# Patient Record
Sex: FEMALE | Race: BLACK OR AFRICAN AMERICAN
[De-identification: names, ages, dates, MRNs, and addresses within clinical notes are randomized per-mention and may not be internally consistent; named-entity substitution may affect disease eponyms.]

---

## 2019-04-01 ENCOUNTER — HOSPITAL ENCOUNTER (OUTPATIENT)
Dept: HOSPITAL 53 - M RAD | Age: 19
End: 2019-04-01
Payer: COMMERCIAL

## 2019-04-01 DIAGNOSIS — M79.672: Primary | ICD-10-CM

## 2019-04-01 PROCEDURE — 78315 BONE IMAGING 3 PHASE: CPT

## 2019-04-01 NOTE — REP
TRIPLE PHASE BONE SCAN OF THE ANKLES AND FEET:

 

Following the intravenous administration of 21.7 millicuries of Technetium 99M

MDP, patient's ankles and feet are imaged in the flow phase and the anterior and

posterior projections. There is fairly symmetrical blood flow bilaterally.

 

Immediate blood pool in 3 hour delayed images are performed in the ankles and

feet in multiple projections. Slightly increased blood pooling is seen medially

in the left hindfoot. There is focal increased uptake at this location on delayed

images. This appears to be located in the anteromedial aspect of the talus or

calcaneus near the anterior aspect of the talocalcaneal joint. Otherwise there is

symmetrical radiotracer uptake bilaterally. No other scintigraphic abnormalities

are seen.

 

IMPRESSION:

 

Focal increase blood pooling and delayed activity in the left medial hindfoot,

specifically this appears to be located in the anteromedial aspect of the talus

or calcaneus. This could represent a stress fracture or stress related change.

 

 

Electronically Signed by

Dorian Daniels MD 04/02/2019 03:23 P

## 2019-06-11 ENCOUNTER — HOSPITAL ENCOUNTER (EMERGENCY)
Dept: HOSPITAL 53 - M ED | Age: 19
Discharge: HOME | End: 2019-06-11
Payer: COMMERCIAL

## 2019-06-11 VITALS — BODY MASS INDEX: 22.5 KG/M2 | WEIGHT: 122.25 LBS | HEIGHT: 62 IN

## 2019-06-11 VITALS — DIASTOLIC BLOOD PRESSURE: 63 MMHG | SYSTOLIC BLOOD PRESSURE: 102 MMHG

## 2019-06-11 DIAGNOSIS — S06.0X0A: Primary | ICD-10-CM
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
## 2019-06-11 DIAGNOSIS — Z79.899: ICD-10-CM
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
## 2019-06-11 NOTE — REPVR
EXAM: 

 CT Head Without Contrast 



EXAM DATE/TIME: 

 6/11/2019 8:01 PM 



CLINICAL HISTORY: 

 18 years old, female; Injury or trauma; Additional info: Box fell on head 



TECHNIQUE: 

 Imaging protocol: Axial computed tomography images of the head without 

contrast. 

 Radiation optimization: All CT scans at this facility use at least one of 

these dose optimization techniques: automated exposure control; mA and/or kV 

adjustment per patient size (includes targeted exams where dose is matched to 

clinical indication); or iterative reconstruction. 



COMPARISON: 

 No relevant prior studies available. 



FINDINGS: 

 Brain: Normal. No hemorrhage. Unremarkable white matter. No mass effect. 

 Ventricles: Normal. No ventriculomegaly. 

 Bones/joints: Unremarkable. No acute fracture. 

 Sinuses: Visualized sinuses are unremarkable. No fluid levels. 

 Mastoid air cells: Visualized mastoid air cells are well aerated. No mastoid 

effusion. 

 Soft tissues: Unremarkable. 



IMPRESSION: 

No acute intracranial abnormality. 



Electronically signed by: Joshua Arriaga On 06/11/2019  20:37:47 PM

## 2019-06-11 NOTE — REPVR
EXAM: 

 CT Cervical Spine Without Contrast 



EXAM DATE/TIME: 

 6/11/2019 8:01 PM 



CLINICAL HISTORY: 

 18 years old, female; Injury or trauma; Initial encounter; Blunt trauma; 

Additional info: Box fell on head 



TECHNIQUE: 

 Imaging protocol: Axial computed tomography images of the cervical spine 

without contrast. Coronal and sagittal reformatted images were created and 

reviewed. 

 Radiation optimization: All CT scans at this facility use at least one of 

these dose optimization techniques: automated exposure control; mA and/or kV 

adjustment per patient size (includes targeted exams where dose is matched to 

clinical indication); or iterative reconstruction. 



COMPARISON: 

 No relevant prior studies available. 



FINDINGS: 

 Vertebrae: No acute fracture. Normal alignment. 

 Discs/Spinal canal/Neural foramina: No spinal stenosis. No neural foraminal 

narrowing. 



 Soft tissues: Unremarkable. 

 Thyroid: Small less than 5 mm nodule left lobe of the thyroid gland 

 Lungs: Lung apices are normal. 



IMPRESSION: 

No acute findings.



COMMENT: 

 Consistent with the [employer]'s Incidental Findings 

Committee Report (J Am Derrick Radiol 2015): Unless the patient has clinical risk 

factors for thyroid cancer or has suspicious findings characterized in this 

report, for patients under 35 years old any thyroid nodule less than 1.0 cm, 

and for patients at least 35 years old any thyroid nodule less than 1.5 cm is 

highly likely to be benign and does not require follow-up imaging or biopsy. 

Patients with limited life expectancy and/or comorbidities do not require 

follow up imaging or biopsy for nodules of any size. 



Electronically signed by: Joshua Arriaga On 06/11/2019  20:36:10 PM

## 2019-09-15 ENCOUNTER — HOSPITAL ENCOUNTER (EMERGENCY)
Dept: HOSPITAL 53 - M ED | Age: 19
Discharge: HOME | End: 2019-09-15
Payer: COMMERCIAL

## 2019-09-15 VITALS — HEIGHT: 62 IN | WEIGHT: 119.71 LBS | BODY MASS INDEX: 22.03 KG/M2

## 2019-09-15 VITALS — DIASTOLIC BLOOD PRESSURE: 65 MMHG | SYSTOLIC BLOOD PRESSURE: 113 MMHG

## 2019-09-15 DIAGNOSIS — Z72.51: ICD-10-CM

## 2019-09-15 DIAGNOSIS — Z79.899: ICD-10-CM

## 2019-09-15 DIAGNOSIS — Z77.21: Primary | ICD-10-CM

## 2019-09-15 DIAGNOSIS — N90.89: ICD-10-CM

## 2019-09-15 LAB
APPEARANCE UR: CLEAR
BACTERIA UR QL AUTO: NEGATIVE
BILIRUB UR QL STRIP.AUTO: NEGATIVE
CHLAMYDIA DNA AMPLIFICATION: NEGATIVE
GLUCOSE UR QL STRIP.AUTO: NEGATIVE MG/DL
HGB UR QL STRIP.AUTO: NEGATIVE
KETONES UR QL STRIP.AUTO: NEGATIVE MG/DL
LEUKOCYTE ESTERASE UR QL STRIP.AUTO: (no result)
MUCOUS THREADS URNS QL MICRO: (no result)
N GONORRHOEA RRNA SPEC QL NAA+PROBE: NEGATIVE
NITRITE UR QL STRIP.AUTO: NEGATIVE
PH UR STRIP.AUTO: 6 UNITS (ref 5–9)
PROT UR QL STRIP.AUTO: NEGATIVE MG/DL
RBC # UR AUTO: 4 /HPF (ref 0–3)
SP GR UR STRIP.AUTO: 1.02 (ref 1–1.03)
SQUAMOUS #/AREA URNS AUTO: 4 /HPF (ref 0–6)
UROBILINOGEN UR QL STRIP.AUTO: 2 MG/DL (ref 0–2)
WBC #/AREA URNS AUTO: 2 /HPF (ref 0–3)

## 2019-09-26 ENCOUNTER — HOSPITAL ENCOUNTER (EMERGENCY)
Dept: HOSPITAL 53 - M ED | Age: 19
Discharge: HOME | End: 2019-09-26
Payer: COMMERCIAL

## 2019-09-26 VITALS — SYSTOLIC BLOOD PRESSURE: 111 MMHG | DIASTOLIC BLOOD PRESSURE: 59 MMHG

## 2019-09-26 VITALS — BODY MASS INDEX: 22.13 KG/M2 | WEIGHT: 120.26 LBS | HEIGHT: 62 IN

## 2019-09-26 DIAGNOSIS — R30.0: Primary | ICD-10-CM

## 2022-06-07 ENCOUNTER — HOSPITAL ENCOUNTER (INPATIENT)
Dept: HOSPITAL 53 - M ED | Age: 22
LOS: 3 days | Discharge: HOME | DRG: 885 | End: 2022-06-10
Attending: STUDENT IN AN ORGANIZED HEALTH CARE EDUCATION/TRAINING PROGRAM | Admitting: STUDENT IN AN ORGANIZED HEALTH CARE EDUCATION/TRAINING PROGRAM
Payer: COMMERCIAL

## 2022-06-07 VITALS — BODY MASS INDEX: 21.14 KG/M2 | WEIGHT: 114.86 LBS | HEIGHT: 62 IN

## 2022-06-07 DIAGNOSIS — F17.200: ICD-10-CM

## 2022-06-07 DIAGNOSIS — F43.21: ICD-10-CM

## 2022-06-07 DIAGNOSIS — R45.851: ICD-10-CM

## 2022-06-07 DIAGNOSIS — F43.10: ICD-10-CM

## 2022-06-07 DIAGNOSIS — F12.90: ICD-10-CM

## 2022-06-07 DIAGNOSIS — F32.1: Primary | ICD-10-CM

## 2022-06-07 DIAGNOSIS — Z91.410: ICD-10-CM

## 2022-06-07 LAB
ALBUMIN SERPL BCG-MCNC: 4 GM/DL (ref 3.2–5.2)
ALT SERPL W P-5'-P-CCNC: 16 U/L (ref 12–78)
AMPHETAMINES UR QL SCN: NEGATIVE
APAP SERPL-MCNC: < 2 UG/ML (ref 10–30)
B-HCG SERPL QL: NEGATIVE
BARBITURATES UR QL SCN: NEGATIVE
BENZODIAZ UR QL SCN: NEGATIVE
BILIRUB CONJ SERPL-MCNC: 0.2 MG/DL (ref 0–0.2)
BILIRUB SERPL-MCNC: 0.9 MG/DL (ref 0.2–1)
BUN SERPL-MCNC: 8 MG/DL (ref 7–18)
BZE UR QL SCN: NEGATIVE
CALCIUM SERPL-MCNC: 9.4 MG/DL (ref 8.5–10.1)
CANNABINOIDS UR QL SCN: POSITIVE
CHLORIDE SERPL-SCNC: 108 MEQ/L (ref 98–107)
CO2 SERPL-SCNC: 30 MEQ/L (ref 21–32)
CREAT SERPL-MCNC: 0.88 MG/DL (ref 0.55–1.3)
ETHANOL SERPL-MCNC: < 0.003 % (ref 0–0.01)
GFR SERPL CREATININE-BSD FRML MDRD: > 60 ML/MIN/{1.73_M2} (ref 60–?)
GLUCOSE SERPL-MCNC: 88 MG/DL (ref 70–100)
HCT VFR BLD AUTO: 43 % (ref 36–47)
HGB BLD-MCNC: 13.5 G/DL (ref 12–15.5)
MCH RBC QN AUTO: 27.7 PG (ref 27–33)
MCHC RBC AUTO-ENTMCNC: 31.4 G/DL (ref 32–36.5)
MCV RBC AUTO: 88.3 FL (ref 80–96)
METHADONE UR QL SCN: NEGATIVE
OPIATES UR QL SCN: NEGATIVE
PCP UR QL SCN: NEGATIVE
PLATELET # BLD AUTO: 188 10^3/UL (ref 150–450)
POTASSIUM SERPL-SCNC: 4.5 MEQ/L (ref 3.5–5.1)
PROT SERPL-MCNC: 7.4 GM/DL (ref 6.4–8.2)
RBC # BLD AUTO: 4.87 10^6/UL (ref 4–5.4)
RSV RNA NPH QL NAA+PROBE: NEGATIVE
SALICYLATES SERPL-MCNC: < 1.7 MG/DL (ref 5–30)
SODIUM SERPL-SCNC: 141 MEQ/L (ref 136–145)
TSH SERPL DL<=0.005 MIU/L-ACNC: 2.56 UIU/ML (ref 0.36–3.74)
WBC # BLD AUTO: 5.7 10^3/UL (ref 4–10)

## 2022-06-08 RX ADMIN — NICOTINE SCH PATCH: 21 PATCH, EXTENDED RELEASE TRANSDERMAL at 15:55

## 2022-06-09 VITALS — DIASTOLIC BLOOD PRESSURE: 76 MMHG | SYSTOLIC BLOOD PRESSURE: 126 MMHG

## 2022-06-09 VITALS — SYSTOLIC BLOOD PRESSURE: 102 MMHG | DIASTOLIC BLOOD PRESSURE: 58 MMHG

## 2022-06-09 RX ADMIN — NICOTINE SCH PATCH: 21 PATCH, EXTENDED RELEASE TRANSDERMAL at 08:16

## 2022-06-10 VITALS — DIASTOLIC BLOOD PRESSURE: 53 MMHG | SYSTOLIC BLOOD PRESSURE: 99 MMHG

## 2022-06-10 RX ADMIN — NICOTINE SCH PATCH: 21 PATCH, EXTENDED RELEASE TRANSDERMAL at 08:31

## 2022-07-17 ENCOUNTER — HOSPITAL ENCOUNTER (EMERGENCY)
Dept: HOSPITAL 53 - M ED | Age: 22
Discharge: HOME | End: 2022-07-17
Payer: COMMERCIAL

## 2022-07-17 VITALS — SYSTOLIC BLOOD PRESSURE: 139 MMHG | DIASTOLIC BLOOD PRESSURE: 89 MMHG

## 2022-07-17 VITALS — BODY MASS INDEX: 19.03 KG/M2 | WEIGHT: 103.4 LBS | HEIGHT: 62 IN

## 2022-07-17 DIAGNOSIS — R10.9: Primary | ICD-10-CM

## 2022-07-17 DIAGNOSIS — R11.2: ICD-10-CM

## 2022-07-17 DIAGNOSIS — Z79.899: ICD-10-CM

## 2022-07-17 DIAGNOSIS — R74.8: ICD-10-CM

## 2022-07-17 LAB
ALBUMIN SERPL BCG-MCNC: 4.5 GM/DL (ref 3.2–5.2)
ALT SERPL W P-5'-P-CCNC: 25 U/L (ref 12–78)
AMPHETAMINES UR QL SCN: NEGATIVE
B-HCG SERPL QL: NEGATIVE
BARBITURATES UR QL SCN: NEGATIVE
BENZODIAZ UR QL SCN: NEGATIVE
BILIRUB CONJ SERPL-MCNC: 0.4 MG/DL (ref 0–0.2)
BILIRUB SERPL-MCNC: 1.2 MG/DL (ref 0.2–1)
BUN SERPL-MCNC: 11 MG/DL (ref 7–18)
BZE UR QL SCN: NEGATIVE
CALCIUM SERPL-MCNC: 9.8 MG/DL (ref 8.5–10.1)
CANNABINOIDS UR QL SCN: POSITIVE
CHLORIDE SERPL-SCNC: 96 MEQ/L (ref 98–107)
CO2 SERPL-SCNC: 29 MEQ/L (ref 21–32)
CREAT SERPL-MCNC: 1.04 MG/DL (ref 0.55–1.3)
GFR SERPL CREATININE-BSD FRML MDRD: > 60 ML/MIN/{1.73_M2} (ref 60–?)
GLUCOSE SERPL-MCNC: 90 MG/DL (ref 70–100)
HCT VFR BLD AUTO: 46.4 % (ref 36–47)
HGB BLD-MCNC: 15.4 G/DL (ref 12–15.5)
LIPASE SERPL-CCNC: 401 U/L (ref 73–393)
LYMPHOCYTES NFR BLD MANUAL: 16 % (ref 16–44)
MCH RBC QN AUTO: 27.5 PG (ref 27–33)
MCHC RBC AUTO-ENTMCNC: 33.2 G/DL (ref 32–36.5)
MCV RBC AUTO: 83 FL (ref 80–96)
METHADONE UR QL SCN: NEGATIVE
MONOCYTES NFR BLD MANUAL: 4 % (ref 0–5)
N GONORRHOEA RRNA SPEC QL NAA+PROBE: NEGATIVE
NEUTROPHILS NFR BLD MANUAL: 80 % (ref 28–66)
OPIATES UR QL SCN: NEGATIVE
PCP UR QL SCN: NEGATIVE
PLATELET # BLD AUTO: 197 10^3/UL (ref 150–450)
PLATELET BLD QL SMEAR: NORMAL
POTASSIUM SERPL-SCNC: 3.9 MEQ/L (ref 3.5–5.1)
PROT SERPL-MCNC: 8.4 GM/DL (ref 6.4–8.2)
RBC # BLD AUTO: 5.59 10^6/UL (ref 4–5.4)
RBC MORPH BLD: NORMAL
SODIUM SERPL-SCNC: 134 MEQ/L (ref 136–145)
WBC # BLD AUTO: 12 10^3/UL (ref 4–10)

## 2022-07-17 PROCEDURE — 87661 TRICHOMONAS VAGINALIS AMPLIF: CPT

## 2022-07-17 PROCEDURE — 80048 BASIC METABOLIC PNL TOTAL CA: CPT

## 2022-07-17 PROCEDURE — 96375 TX/PRO/DX INJ NEW DRUG ADDON: CPT

## 2022-07-17 PROCEDURE — 87581 M.PNEUMON DNA AMP PROBE: CPT

## 2022-07-17 PROCEDURE — 87486 CHLMYD PNEUM DNA AMP PROBE: CPT

## 2022-07-17 PROCEDURE — 83690 ASSAY OF LIPASE: CPT

## 2022-07-17 PROCEDURE — 87798 DETECT AGENT NOS DNA AMP: CPT

## 2022-07-17 PROCEDURE — 74177 CT ABD & PELVIS W/CONTRAST: CPT

## 2022-07-17 PROCEDURE — 87810 CHLMYD TRACH ASSAY W/OPTIC: CPT

## 2022-07-17 PROCEDURE — 80076 HEPATIC FUNCTION PANEL: CPT

## 2022-07-17 PROCEDURE — 81001 URINALYSIS AUTO W/SCOPE: CPT

## 2022-07-17 PROCEDURE — 87850 N. GONORRHOEAE ASSAY W/OPTIC: CPT

## 2022-07-17 PROCEDURE — 80307 DRUG TEST PRSMV CHEM ANLYZR: CPT

## 2022-07-17 PROCEDURE — 96374 THER/PROPH/DIAG INJ IV PUSH: CPT

## 2022-07-17 PROCEDURE — 87088 URINE BACTERIA CULTURE: CPT

## 2022-07-17 PROCEDURE — 80047 BASIC METABLC PNL IONIZED CA: CPT

## 2022-07-17 PROCEDURE — 87186 SC STD MICRODIL/AGAR DIL: CPT

## 2022-07-17 PROCEDURE — 96361 HYDRATE IV INFUSION ADD-ON: CPT

## 2022-07-17 PROCEDURE — 99284 EMERGENCY DEPT VISIT MOD MDM: CPT

## 2022-07-17 PROCEDURE — 87633 RESP VIRUS 12-25 TARGETS: CPT

## 2022-07-17 PROCEDURE — 84703 CHORIONIC GONADOTROPIN ASSAY: CPT

## 2022-07-17 PROCEDURE — 85025 COMPLETE CBC W/AUTO DIFF WBC: CPT

## 2023-02-09 ENCOUNTER — HOSPITAL ENCOUNTER (INPATIENT)
Dept: HOSPITAL 53 - M ED | Age: 23
LOS: 5 days | Discharge: HOME | DRG: 885 | End: 2023-02-14
Attending: PSYCHIATRY & NEUROLOGY | Admitting: PSYCHIATRY & NEUROLOGY
Payer: COMMERCIAL

## 2023-02-09 VITALS — DIASTOLIC BLOOD PRESSURE: 57 MMHG | SYSTOLIC BLOOD PRESSURE: 105 MMHG

## 2023-02-09 VITALS — BODY MASS INDEX: 19.07 KG/M2 | WEIGHT: 103.62 LBS | HEIGHT: 62 IN

## 2023-02-09 DIAGNOSIS — F12.90: ICD-10-CM

## 2023-02-09 DIAGNOSIS — F31.81: Primary | ICD-10-CM

## 2023-02-09 DIAGNOSIS — Z62.810: ICD-10-CM

## 2023-02-09 DIAGNOSIS — N39.0: ICD-10-CM

## 2023-02-09 DIAGNOSIS — R45.851: ICD-10-CM

## 2023-02-09 LAB
ALBUMIN SERPL BCG-MCNC: 4 G/DL (ref 3.2–5.2)
ALP SERPL-CCNC: 51 U/L (ref 46–116)
ALT SERPL W P-5'-P-CCNC: 11 U/L (ref 7–40)
AMPHETAMINES UR QL SCN: NEGATIVE
APAP SERPL-MCNC: < 2 UG/ML (ref 10–20)
AST SERPL-CCNC: 16 U/L (ref ?–34)
B-HCG SERPL QL: NEGATIVE
BARBITURATES UR QL SCN: NEGATIVE
BENZODIAZ UR QL SCN: NEGATIVE
BILIRUB CONJ SERPL-MCNC: 0.3 MG/DL (ref ?–0.4)
BILIRUB SERPL-MCNC: 0.6 MG/DL (ref 0.3–1.2)
BUN SERPL-MCNC: 9 MG/DL (ref 9–23)
BZE UR QL SCN: NEGATIVE
CALCIUM SERPL-MCNC: 9.3 MG/DL (ref 8.5–10.1)
CANNABINOIDS UR QL SCN: NEGATIVE
CHLORIDE SERPL-SCNC: 103 MMOL/L (ref 98–107)
CO2 SERPL-SCNC: 30 MMOL/L (ref 20–31)
CREAT SERPL-MCNC: 0.8 MG/DL (ref 0.55–1.3)
ETHANOL SERPL-MCNC: < 0.003 % (ref 0–0.01)
GFR SERPL CREATININE-BSD FRML MDRD: > 60 ML/MIN/{1.73_M2} (ref 60–?)
GLUCOSE SERPL-MCNC: 81 MG/DL (ref 60–100)
HCT VFR BLD AUTO: 39.1 % (ref 36–47)
HGB BLD-MCNC: 12.2 G/DL (ref 12–15.5)
MCH RBC QN AUTO: 27.4 PG (ref 27–33)
MCHC RBC AUTO-ENTMCNC: 31.2 G/DL (ref 32–36.5)
MCV RBC AUTO: 87.7 FL (ref 80–96)
METHADONE UR QL SCN: NEGATIVE
OPIATES UR QL SCN: NEGATIVE
PCP UR QL SCN: NEGATIVE
PLATELET # BLD AUTO: 181 10^3/UL (ref 150–450)
POTASSIUM SERPL-SCNC: 4.1 MMOL/L (ref 3.5–5.1)
PROT SERPL-MCNC: 6.5 G/DL (ref 5.7–8.2)
RBC # BLD AUTO: 4.46 10^6/UL (ref 4–5.4)
SALICYLATES SERPL-MCNC: < 3 MG/DL (ref ?–30)
SODIUM SERPL-SCNC: 136 MMOL/L (ref 136–145)
TSH SERPL DL<=0.005 MIU/L-ACNC: 1.62 UIU/ML (ref 0.55–4.78)
WBC # BLD AUTO: 6.2 10^3/UL (ref 4–10)

## 2023-02-10 VITALS — SYSTOLIC BLOOD PRESSURE: 95 MMHG | DIASTOLIC BLOOD PRESSURE: 54 MMHG

## 2023-02-10 VITALS — SYSTOLIC BLOOD PRESSURE: 116 MMHG | DIASTOLIC BLOOD PRESSURE: 65 MMHG

## 2023-02-10 RX ADMIN — CEFDINIR SCH MG: 300 CAPSULE ORAL at 10:54

## 2023-02-10 RX ADMIN — CEFDINIR SCH MG: 300 CAPSULE ORAL at 20:41

## 2023-02-10 RX ADMIN — NICOTINE POLACRILEX PRN MG: 2 GUM, CHEWING BUCCAL at 09:25

## 2023-02-10 RX ADMIN — NICOTINE POLACRILEX PRN MG: 2 GUM, CHEWING BUCCAL at 16:47

## 2023-02-10 RX ADMIN — NICOTINE POLACRILEX PRN MG: 2 GUM, CHEWING BUCCAL at 23:23

## 2023-02-11 VITALS — DIASTOLIC BLOOD PRESSURE: 63 MMHG | SYSTOLIC BLOOD PRESSURE: 134 MMHG

## 2023-02-11 VITALS — SYSTOLIC BLOOD PRESSURE: 89 MMHG | DIASTOLIC BLOOD PRESSURE: 52 MMHG

## 2023-02-11 VITALS — SYSTOLIC BLOOD PRESSURE: 101 MMHG | DIASTOLIC BLOOD PRESSURE: 50 MMHG

## 2023-02-11 RX ADMIN — NICOTINE POLACRILEX PRN MG: 2 GUM, CHEWING BUCCAL at 15:54

## 2023-02-11 RX ADMIN — LAMOTRIGINE SCH MG: 25 TABLET ORAL at 08:30

## 2023-02-11 RX ADMIN — CEFDINIR SCH MG: 300 CAPSULE ORAL at 08:30

## 2023-02-11 RX ADMIN — NICOTINE POLACRILEX PRN MG: 2 GUM, CHEWING BUCCAL at 08:32

## 2023-02-11 RX ADMIN — CEFDINIR SCH MG: 300 CAPSULE ORAL at 20:39

## 2023-02-11 RX ADMIN — NICOTINE POLACRILEX PRN MG: 2 GUM, CHEWING BUCCAL at 23:28

## 2023-02-12 VITALS — SYSTOLIC BLOOD PRESSURE: 109 MMHG | DIASTOLIC BLOOD PRESSURE: 58 MMHG

## 2023-02-12 VITALS — DIASTOLIC BLOOD PRESSURE: 49 MMHG | SYSTOLIC BLOOD PRESSURE: 100 MMHG

## 2023-02-12 RX ADMIN — NICOTINE POLACRILEX PRN MG: 2 GUM, CHEWING BUCCAL at 09:21

## 2023-02-12 RX ADMIN — CEFDINIR SCH MG: 300 CAPSULE ORAL at 09:19

## 2023-02-12 RX ADMIN — NICOTINE POLACRILEX PRN MG: 2 GUM, CHEWING BUCCAL at 20:09

## 2023-02-12 RX ADMIN — LAMOTRIGINE SCH MG: 25 TABLET ORAL at 09:19

## 2023-02-12 RX ADMIN — CEFDINIR SCH MG: 300 CAPSULE ORAL at 20:09

## 2023-02-13 VITALS — SYSTOLIC BLOOD PRESSURE: 100 MMHG | DIASTOLIC BLOOD PRESSURE: 60 MMHG

## 2023-02-13 VITALS — SYSTOLIC BLOOD PRESSURE: 82 MMHG | DIASTOLIC BLOOD PRESSURE: 54 MMHG

## 2023-02-13 RX ADMIN — NICOTINE POLACRILEX PRN MG: 2 GUM, CHEWING BUCCAL at 21:45

## 2023-02-13 RX ADMIN — NICOTINE POLACRILEX PRN MG: 2 GUM, CHEWING BUCCAL at 08:39

## 2023-02-13 RX ADMIN — CEFDINIR SCH MG: 300 CAPSULE ORAL at 08:37

## 2023-02-13 RX ADMIN — NICOTINE POLACRILEX PRN MG: 2 GUM, CHEWING BUCCAL at 15:14

## 2023-02-13 RX ADMIN — CEFDINIR SCH MG: 300 CAPSULE ORAL at 21:45

## 2023-02-13 RX ADMIN — LAMOTRIGINE SCH MG: 25 TABLET ORAL at 08:37

## 2023-02-14 VITALS — DIASTOLIC BLOOD PRESSURE: 69 MMHG | SYSTOLIC BLOOD PRESSURE: 114 MMHG

## 2023-02-14 RX ADMIN — CEFDINIR SCH MG: 300 CAPSULE ORAL at 08:05

## 2023-02-14 RX ADMIN — LAMOTRIGINE SCH MG: 25 TABLET ORAL at 08:05

## 2023-02-14 RX ADMIN — NICOTINE POLACRILEX PRN MG: 2 GUM, CHEWING BUCCAL at 08:06

## 2023-02-14 RX ADMIN — NICOTINE POLACRILEX PRN MG: 2 GUM, CHEWING BUCCAL at 12:59

## 2023-07-12 ENCOUNTER — HOSPITAL ENCOUNTER (OUTPATIENT)
Dept: HOSPITAL 53 - M PLAIMG | Age: 23
End: 2023-07-12
Attending: NURSE PRACTITIONER
Payer: COMMERCIAL

## 2023-07-12 DIAGNOSIS — R51.9: ICD-10-CM

## 2023-07-12 DIAGNOSIS — R07.9: Primary | ICD-10-CM

## 2023-07-12 DIAGNOSIS — R35.0: ICD-10-CM

## 2023-07-12 LAB
APPEARANCE UR: (no result)
BACTERIA UR QL AUTO: NEGATIVE
BILIRUB UR QL STRIP.AUTO: NEGATIVE
GLUCOSE UR QL STRIP.AUTO: NEGATIVE MG/DL
HGB UR QL STRIP.AUTO: (no result)
KETONES UR QL STRIP.AUTO: NEGATIVE MG/DL
LEUKOCYTE ESTERASE UR QL STRIP.AUTO: NEGATIVE
MUCOUS THREADS URNS QL MICRO: (no result)
NITRITE UR QL STRIP.AUTO: NEGATIVE
PH UR STRIP.AUTO: 6 UNITS (ref 5–9)
PROT UR QL STRIP.AUTO: NEGATIVE MG/DL
RBC # UR AUTO: 0 /HPF (ref 0–3)
SP GR UR STRIP.AUTO: 1.01 (ref 1–1.03)
SQUAMOUS #/AREA URNS AUTO: 6 /HPF (ref 0–6)
T3FREE SERPL-MCNC: 3.1 PG/ML (ref 2.3–4.2)
T4 FREE SERPL-MCNC: 1.22 NG/DL (ref 0.89–1.76)
TSH SERPL DL<=0.005 MIU/L-ACNC: 1.63 UIU/ML (ref 0.55–4.78)
UROBILINOGEN UR QL STRIP.AUTO: 0.2 MG/DL (ref 0–2)
WBC #/AREA URNS AUTO: 1 /HPF (ref 0–3)